# Patient Record
Sex: MALE | Race: WHITE | NOT HISPANIC OR LATINO | Employment: FULL TIME | URBAN - METROPOLITAN AREA
[De-identification: names, ages, dates, MRNs, and addresses within clinical notes are randomized per-mention and may not be internally consistent; named-entity substitution may affect disease eponyms.]

---

## 2019-08-29 ENCOUNTER — OFFICE VISIT (OUTPATIENT)
Dept: URGENT CARE | Facility: CLINIC | Age: 24
End: 2019-08-29

## 2019-08-29 VITALS
WEIGHT: 213 LBS | RESPIRATION RATE: 16 BRPM | OXYGEN SATURATION: 98 % | SYSTOLIC BLOOD PRESSURE: 118 MMHG | TEMPERATURE: 97.9 F | DIASTOLIC BLOOD PRESSURE: 70 MMHG | HEART RATE: 96 BPM

## 2019-08-29 DIAGNOSIS — K52.9 AGE (ACUTE GASTROENTERITIS): ICD-10-CM

## 2019-08-29 PROCEDURE — 99202 OFFICE O/P NEW SF 15 MIN: CPT | Performed by: NURSE PRACTITIONER

## 2019-08-29 SDOH — HEALTH STABILITY: MENTAL HEALTH: HOW OFTEN DO YOU HAVE A DRINK CONTAINING ALCOHOL?: NEVER

## 2019-08-29 ASSESSMENT — ENCOUNTER SYMPTOMS
HEADACHES: 0
NAUSEA: 1
FEVER: 0
VOMITING: 1
CHILLS: 0
DIZZINESS: 0
DIARRHEA: 1
MYALGIAS: 1
ABDOMINAL PAIN: 1

## 2019-08-29 NOTE — LETTER
August 29, 2019        Evangelista Martini  [address]  [address]  [facility] [address]        Evangelista was seen in our clinic today and he is excused from work for Tuesday, Wednesday and Thursday.  If you have any questions or concerns, please don't hesitate to call.        Sincerely,        STEVE Bennett.P.N.    Electronically Signed

## 2019-08-30 NOTE — PROGRESS NOTES
Subjective:      Evangelista Martini is a 24 y.o. male who presents with Diarrhea (pt states he has been having stomach pain x 2 days and having diarrhea)            HPI New. 24 year old male with nausea, stomach cramps and diarrhea since Tuesday. Denies fever, chills, headache. He has had one episode of vomiting. Stools are runny but no blood or pus. No known sick contacts or consumption of bad food. He has taken otc anti diarrheal for this with some improvement.  Codeine; Morphine; and Penicillins  Current Outpatient Medications on File Prior to Visit   Medication Sig Dispense Refill   • NS SOLN 60 mL with albuterol 2.5 mg/0.5 mL NEBU 5 mL 5 mg/hr by Nebulization route.       No current facility-administered medications on file prior to visit.      Social History     Socioeconomic History   • Marital status: Single     Spouse name: Not on file   • Number of children: Not on file   • Years of education: Not on file   • Highest education level: Not on file   Occupational History   • Not on file   Social Needs   • Financial resource strain: Not on file   • Food insecurity:     Worry: Not on file     Inability: Not on file   • Transportation needs:     Medical: Not on file     Non-medical: Not on file   Tobacco Use   • Smoking status: Never Smoker   • Smokeless tobacco: Never Used   Substance and Sexual Activity   • Alcohol use: Never     Frequency: Never   • Drug use: Never   • Sexual activity: Not on file   Lifestyle   • Physical activity:     Days per week: Not on file     Minutes per session: Not on file   • Stress: Not on file   Relationships   • Social connections:     Talks on phone: Not on file     Gets together: Not on file     Attends Jainism service: Not on file     Active member of club or organization: Not on file     Attends meetings of clubs or organizations: Not on file     Relationship status: Not on file   • Intimate partner violence:     Fear of current or ex partner: Not on file     Emotionally  abused: Not on file     Physically abused: Not on file     Forced sexual activity: Not on file   Other Topics Concern   • Not on file   Social History Narrative   • Not on file     Breast Cancer-related family history is not on file.      Review of Systems   Constitutional: Positive for malaise/fatigue. Negative for chills and fever.   Gastrointestinal: Positive for abdominal pain, diarrhea, nausea and vomiting.   Genitourinary: Negative.    Musculoskeletal: Positive for myalgias.   Neurological: Negative for dizziness and headaches.          Objective:     /70 (BP Location: Left arm, Patient Position: Sitting, BP Cuff Size: Adult)   Pulse 96   Temp 36.6 °C (97.9 °F) (Temporal)   Resp 16   Wt 96.6 kg (213 lb)   SpO2 98%      Physical Exam   Constitutional: He is oriented to person, place, and time. He appears well-developed and well-nourished.   Cardiovascular: Normal rate, regular rhythm and normal heart sounds.   No murmur heard.  Pulmonary/Chest: Effort normal and breath sounds normal.   Abdominal: Soft. Bowel sounds are increased. There is no tenderness. There is no CVA tenderness.   Musculoskeletal: Normal range of motion.   Neurological: He is alert and oriented to person, place, and time.   Skin: Skin is warm and dry.   Psychiatric: He has a normal mood and affect.   Nursing note and vitals reviewed.              Assessment/Plan:     1. AGE (acute gastroenteritis)       Discussed diet: start clear fluids and increase slowly as tolerated.   immodium for diarrhea as directed  Work note.  Differential diagnosis, natural history, supportive care, and indications for immediate follow-up discussed at length.

## 2021-09-04 ENCOUNTER — OFFICE VISIT (OUTPATIENT)
Dept: URGENT CARE | Facility: CLINIC | Age: 26
End: 2021-09-04
Payer: MEDICAID

## 2021-09-04 ENCOUNTER — HOSPITAL ENCOUNTER (OUTPATIENT)
Facility: MEDICAL CENTER | Age: 26
End: 2021-09-04
Attending: PHYSICIAN ASSISTANT
Payer: MEDICAID

## 2021-09-04 VITALS
HEIGHT: 74 IN | BODY MASS INDEX: 32.73 KG/M2 | WEIGHT: 255 LBS | DIASTOLIC BLOOD PRESSURE: 80 MMHG | HEART RATE: 87 BPM | TEMPERATURE: 98.8 F | OXYGEN SATURATION: 99 % | SYSTOLIC BLOOD PRESSURE: 122 MMHG | RESPIRATION RATE: 16 BRPM

## 2021-09-04 DIAGNOSIS — K52.9 GASTROENTERITIS: ICD-10-CM

## 2021-09-04 PROCEDURE — 99213 OFFICE O/P EST LOW 20 MIN: CPT | Performed by: PHYSICIAN ASSISTANT

## 2021-09-04 PROCEDURE — U0003 INFECTIOUS AGENT DETECTION BY NUCLEIC ACID (DNA OR RNA); SEVERE ACUTE RESPIRATORY SYNDROME CORONAVIRUS 2 (SARS-COV-2) (CORONAVIRUS DISEASE [COVID-19]), AMPLIFIED PROBE TECHNIQUE, MAKING USE OF HIGH THROUGHPUT TECHNOLOGIES AS DESCRIBED BY CMS-2020-01-R: HCPCS

## 2021-09-04 PROCEDURE — U0005 INFEC AGEN DETEC AMPLI PROBE: HCPCS

## 2021-09-04 ASSESSMENT — ENCOUNTER SYMPTOMS
CHILLS: 1
ABDOMINAL PAIN: 1
NAUSEA: 1
SINUS PAIN: 0
FEVER: 1
WHEEZING: 0
BLOOD IN STOOL: 0
COUGH: 1
ARTHRALGIAS: 0
MYALGIAS: 0
BLOATING: 0
SORE THROAT: 0
PALPITATIONS: 0
DIARRHEA: 1
SHORTNESS OF BREATH: 0
SWEATS: 1
HEADACHES: 0
FLATUS: 0
VOMITING: 0

## 2021-09-04 NOTE — PROGRESS NOTES
"Subjective     Evangelista Martini is a 26 y.o. male who presents with GI Problem (stomach discomfort, nausea, dry heiving, diarrhea, mild body aches, headaches, how when sleeping waking up sweaty)            Diarrhea   This is a new problem. Episode onset: 4 days ago. The problem has been unchanged. Associated symptoms include abdominal pain (generalized), chills, coughing, a fever (subjective ) and sweats. Pertinent negatives include no arthralgias, bloating, headaches, increased  flatus, myalgias or vomiting. Associated symptoms comments: Dry heaving . Nothing aggravates the symptoms. There are no known risk factors. He has tried nothing for the symptoms.       History reviewed. No pertinent past medical history.    History reviewed. No pertinent surgical history.    History reviewed. No pertinent family history.    Allergies   Allergen Reactions   • Codeine    • Morphine    • Penicillins        Medications, Allergies, and current problem list reviewed today in Epic    Review of Systems   Constitutional: Positive for chills, fever (subjective ) and malaise/fatigue.   HENT: Negative for congestion, sinus pain and sore throat.    Respiratory: Positive for cough. Negative for shortness of breath and wheezing.    Cardiovascular: Negative for chest pain, palpitations and leg swelling.   Gastrointestinal: Positive for abdominal pain (generalized), diarrhea and nausea. Negative for bloating, blood in stool, flatus and vomiting.   Musculoskeletal: Negative for arthralgias and myalgias.   Skin: Negative for rash.   Neurological: Negative for headaches.         All other systems reviewed and are negative.         Objective     /80   Pulse 87   Temp 37.1 °C (98.8 °F)   Resp 16   Ht 1.88 m (6' 2\")   Wt 116 kg (255 lb)   SpO2 99%   BMI 32.74 kg/m²      Physical Exam  Constitutional:       General: He is not in acute distress.     Appearance: He is not ill-appearing.   HENT:      Head: Normocephalic and " atraumatic.      Nose: Nose normal.      Mouth/Throat:      Mouth: Mucous membranes are moist.      Pharynx: No posterior oropharyngeal erythema.   Cardiovascular:      Rate and Rhythm: Normal rate and regular rhythm.      Heart sounds: Normal heart sounds. No murmur heard.     Pulmonary:      Effort: Pulmonary effort is normal. No respiratory distress.      Breath sounds: Normal breath sounds. No wheezing, rhonchi or rales.   Abdominal:      General: There is no distension.      Palpations: Abdomen is soft. There is no mass.      Tenderness: There is no abdominal tenderness. There is no guarding or rebound.   Skin:     General: Skin is warm and dry.   Neurological:      General: No focal deficit present.      Mental Status: He is alert and oriented to person, place, and time.   Psychiatric:         Mood and Affect: Mood normal.         Behavior: Behavior normal.         Thought Content: Thought content normal.         Judgment: Judgment normal.                             Assessment & Plan        1. Gastroenteritis    Encouraged fluids, rest, BRAT diet  Patient refuses anti-nausea meds  - SARS-CoV-2 PCR (24 hour In-House): Collect NP swab in [facility]; Future  COVID test pending.  Isolation guidelines, conservative measures and ER precautions discussed.   COVID handout give.    Differential diagnoses, Supportive care, and indications for immediate follow-up discussed with patient.   Pathogenesis of diagnosis discussed including typical length and natural progression.   Instructed to return to clinic or nearest emergency department for any change in condition, further concerns, or worsening of symptoms.    The patient demonstrated a good understanding and agreed with the treatment plan.    Rosalie Stallworth P.A.-C.

## 2021-09-05 DIAGNOSIS — K52.9 GASTROENTERITIS: ICD-10-CM

## 2021-09-06 LAB
COVID ORDER STATUS COVID19: NORMAL
SARS-COV-2 RNA RESP QL NAA+PROBE: NOTDETECTED
SPECIMEN SOURCE: NORMAL

## 2021-10-06 ENCOUNTER — APPOINTMENT (OUTPATIENT)
Dept: RADIOLOGY | Facility: IMAGING CENTER | Age: 26
End: 2021-10-06
Attending: PHYSICIAN ASSISTANT
Payer: MEDICAID

## 2021-10-06 ENCOUNTER — HOSPITAL ENCOUNTER (OUTPATIENT)
Facility: MEDICAL CENTER | Age: 26
End: 2021-10-06
Attending: PHYSICIAN ASSISTANT
Payer: MEDICAID

## 2021-10-06 ENCOUNTER — OFFICE VISIT (OUTPATIENT)
Dept: URGENT CARE | Facility: CLINIC | Age: 26
End: 2021-10-06
Payer: MEDICAID

## 2021-10-06 VITALS
HEART RATE: 114 BPM | SYSTOLIC BLOOD PRESSURE: 122 MMHG | BODY MASS INDEX: 30.8 KG/M2 | RESPIRATION RATE: 20 BRPM | OXYGEN SATURATION: 94 % | HEIGHT: 74 IN | WEIGHT: 240 LBS | TEMPERATURE: 97.6 F | DIASTOLIC BLOOD PRESSURE: 84 MMHG

## 2021-10-06 DIAGNOSIS — Z20.822 SUSPECTED COVID-19 VIRUS INFECTION: ICD-10-CM

## 2021-10-06 DIAGNOSIS — J18.9 PNEUMONIA OF BOTH LUNGS DUE TO INFECTIOUS ORGANISM, UNSPECIFIED PART OF LUNG: ICD-10-CM

## 2021-10-06 DIAGNOSIS — J98.8 RTI (RESPIRATORY TRACT INFECTION): ICD-10-CM

## 2021-10-06 DIAGNOSIS — J45.901 MILD ASTHMA WITH ACUTE EXACERBATION, UNSPECIFIED WHETHER PERSISTENT: ICD-10-CM

## 2021-10-06 LAB
EXTERNAL QUALITY CONTROL: NORMAL
SARS-COV+SARS-COV-2 AG RESP QL IA.RAPID: NEGATIVE

## 2021-10-06 PROCEDURE — 71046 X-RAY EXAM CHEST 2 VIEWS: CPT | Mod: TC | Performed by: PHYSICIAN ASSISTANT

## 2021-10-06 PROCEDURE — U0003 INFECTIOUS AGENT DETECTION BY NUCLEIC ACID (DNA OR RNA); SEVERE ACUTE RESPIRATORY SYNDROME CORONAVIRUS 2 (SARS-COV-2) (CORONAVIRUS DISEASE [COVID-19]), AMPLIFIED PROBE TECHNIQUE, MAKING USE OF HIGH THROUGHPUT TECHNOLOGIES AS DESCRIBED BY CMS-2020-01-R: HCPCS

## 2021-10-06 PROCEDURE — 87426 SARSCOV CORONAVIRUS AG IA: CPT | Performed by: PHYSICIAN ASSISTANT

## 2021-10-06 PROCEDURE — U0005 INFEC AGEN DETEC AMPLI PROBE: HCPCS

## 2021-10-06 PROCEDURE — 99214 OFFICE O/P EST MOD 30 MIN: CPT | Mod: CS | Performed by: PHYSICIAN ASSISTANT

## 2021-10-06 RX ORDER — DEXAMETHASONE 6 MG/1
6 TABLET ORAL DAILY
Qty: 5 TABLET | Refills: 0 | Status: SHIPPED | OUTPATIENT
Start: 2021-10-06 | End: 2021-10-11

## 2021-10-06 RX ORDER — ALBUTEROL SULFATE 90 UG/1
2 AEROSOL, METERED RESPIRATORY (INHALATION) EVERY 6 HOURS PRN
Qty: 8.5 G | Refills: 0 | Status: SHIPPED | OUTPATIENT
Start: 2021-10-06

## 2021-10-06 RX ORDER — DOXYCYCLINE HYCLATE 100 MG
100 TABLET ORAL 2 TIMES DAILY
Qty: 14 TABLET | Refills: 0 | Status: SHIPPED | OUTPATIENT
Start: 2021-10-06 | End: 2021-10-13

## 2021-10-06 ASSESSMENT — ENCOUNTER SYMPTOMS
WHEEZING: 1
COUGH: 1
HEMOPTYSIS: 0
SORE THROAT: 0
ABDOMINAL PAIN: 0
CHILLS: 0
MYALGIAS: 0
HEADACHES: 0
RHINORRHEA: 0
DIARRHEA: 1
NAUSEA: 0
SINUS PAIN: 0
FEVER: 0
VOMITING: 0
SPUTUM PRODUCTION: 0
SHORTNESS OF BREATH: 1

## 2021-10-06 NOTE — PROGRESS NOTES
"Subjective     Evangelista Taran Martini is a 26 y.o. male who presents with Shortness of Breath (fatigue, diarrhea, x1week, has hx of asthma)            Cough  This is a new problem. Episode onset: 4 days. The problem has been gradually worsening. The cough is non-productive. Associated symptoms include shortness of breath and wheezing. Pertinent negatives include no chest pain, chills, ear pain, fever, headaches, hemoptysis, myalgias, nasal congestion, rhinorrhea or sore throat. He has tried a beta-agonist inhaler and OTC cough suppressant for the symptoms. The treatment provided no relief. His past medical history is significant for asthma.     The patient denies known COVID exposure.  He is not vaccinated against COVID    No past medical history on file.    No past surgical history on file.    No family history on file.    Allergies   Allergen Reactions   • Codeine    • Morphine    • Penicillins        Medications, Allergies, and current problem list reviewed today in Epic      Review of Systems   Constitutional: Positive for malaise/fatigue. Negative for chills and fever.   HENT: Negative for congestion, ear pain, rhinorrhea, sinus pain and sore throat.    Respiratory: Positive for cough, shortness of breath and wheezing. Negative for hemoptysis and sputum production.    Cardiovascular: Negative for chest pain.   Gastrointestinal: Positive for diarrhea. Negative for abdominal pain, nausea and vomiting.   Musculoskeletal: Negative for myalgias.   Neurological: Negative for headaches.     All other systems reviewed and are negative.            Objective     /84   Pulse (!) 114   Temp 36.4 °C (97.6 °F) (Temporal)   Resp 20   Ht 1.88 m (6' 2\")   Wt 109 kg (240 lb)   SpO2 94%   BMI 30.81 kg/m²      Physical Exam  Constitutional:       General: He is not in acute distress.     Appearance: He is ill-appearing (mild).   HENT:      Head: Normocephalic and atraumatic.      Mouth/Throat:      Mouth: Mucous " membranes are moist.      Pharynx: No posterior oropharyngeal erythema.   Eyes:      Conjunctiva/sclera: Conjunctivae normal.   Cardiovascular:      Rate and Rhythm: Regular rhythm. Tachycardia present.      Heart sounds: Normal heart sounds.   Pulmonary:      Effort: Pulmonary effort is normal. No respiratory distress.      Breath sounds: Wheezing (diffuse) present. No rhonchi or rales.   Skin:     General: Skin is warm and dry.      Findings: No rash.   Neurological:      General: No focal deficit present.      Mental Status: He is alert and oriented to person, place, and time.   Psychiatric:         Mood and Affect: Mood normal.         Behavior: Behavior normal.         Thought Content: Thought content normal.         Judgment: Judgment normal.       10/6/2021 4:59 PM     HISTORY/REASON FOR EXAM:  Shortness of Breath; cough and shortness of breath.        TECHNIQUE/EXAM DESCRIPTION AND NUMBER OF VIEWS:  Two views of the chest.     COMPARISON:  None.     FINDINGS:  The heart is normal in size.  There are bilateral peripheral interstitial opacities. There appears be more focal consolidation right middle lobe.  No pleural effusions are appreciated.  No pneumothorax.     IMPRESSION:     Bilateral pulmonary consolidation with a peripheral groundglass pattern suggestive of Covid 19 pneumonia.     More focal consolidation in the right middle lobe could also be due to Covid 19 pneumonia, atelectasis or bacterial pneumonia.        Exam Ended: 10/06/21  5:06 PM                              Assessment & Plan        1. Suspected COVID-19 virus infection  POCT SARS-COV Antigen TARA (Symptomatic Only)    SARS-CoV-2 PCR (24 hour In-House): Collect NP swab in [facility]    DX-CHEST-2 VIEWS    doxycycline (VIBRAMYCIN) 100 MG Tab    dexamethasone (DECADRON) 6 MG Tab    albuterol 108 (90 Base) MCG/ACT Aero Soln inhalation aerosol   2. Pneumonia of both lungs due to infectious organism, unspecified part of lung  doxycycline (VIBRAMYCIN)  100 MG Tab    dexamethasone (DECADRON) 6 MG Tab    albuterol 108 (90 Base) MCG/ACT Aero Soln inhalation aerosol   3. Mild asthma with acute exacerbation, unspecified whether persistent       poct COVID- negative    X-ray reviewed by me and consistent with COVID pneumonia and possibly bacterial pneumonia.  COVID PCR test pending.  Isolation guidelines, conservative measures and ER precautions discussed.   COVID handout given.  COVER with DOXY for possible bacterial component.   RX Decadron 6mg daily x 5 days.   Albuterol     Continue to monitor pulse ox. ED if unable to maintain above 90% or if any drastic worsening in symptoms.    Differential diagnoses, Supportive care, and indications for immediate follow-up discussed with patient.   Pathogenesis of diagnosis discussed including typical length and natural progression.   Instructed to return to clinic or nearest emergency department for any change in condition, further concerns, or worsening of symptoms.    The patient demonstrated a good understanding and agreed with the treatment plan.    Rosalie Stallworth P.A.-C.

## 2021-10-07 DIAGNOSIS — Z20.822 SUSPECTED COVID-19 VIRUS INFECTION: ICD-10-CM

## 2021-10-07 LAB — COVID ORDER STATUS COVID19: NORMAL

## 2021-10-08 LAB
SARS-COV-2 RNA RESP QL NAA+PROBE: DETECTED
SPECIMEN SOURCE: ABNORMAL

## 2021-10-11 ENCOUNTER — OFFICE VISIT (OUTPATIENT)
Dept: URGENT CARE | Facility: CLINIC | Age: 26
End: 2021-10-11
Payer: MEDICAID

## 2021-10-11 VITALS
BODY MASS INDEX: 30.67 KG/M2 | DIASTOLIC BLOOD PRESSURE: 70 MMHG | SYSTOLIC BLOOD PRESSURE: 102 MMHG | HEART RATE: 104 BPM | TEMPERATURE: 97.1 F | WEIGHT: 239 LBS | OXYGEN SATURATION: 98 % | RESPIRATION RATE: 18 BRPM | HEIGHT: 74 IN

## 2021-10-11 DIAGNOSIS — J12.82 PNEUMONIA DUE TO COVID-19 VIRUS: ICD-10-CM

## 2021-10-11 DIAGNOSIS — R05.9 COUGH: ICD-10-CM

## 2021-10-11 DIAGNOSIS — U07.1 PNEUMONIA DUE TO COVID-19 VIRUS: ICD-10-CM

## 2021-10-11 PROCEDURE — 99214 OFFICE O/P EST MOD 30 MIN: CPT | Mod: CS | Performed by: PHYSICIAN ASSISTANT

## 2021-10-11 RX ORDER — BENZONATATE 200 MG/1
200 CAPSULE ORAL 3 TIMES DAILY PRN
Qty: 60 CAPSULE | Refills: 0 | Status: SHIPPED | OUTPATIENT
Start: 2021-10-11

## 2021-10-11 ASSESSMENT — ENCOUNTER SYMPTOMS
FEVER: 0
SPUTUM PRODUCTION: 0
CHILLS: 0
COUGH: 1

## 2021-10-11 NOTE — PROGRESS NOTES
Subjective:   Evangelista Martini is a 26 y.o. male who presents for Coronavirus Screening (needs a second test because he had one done and it was negative and the other was positive)        Patient with recent history of COVID-19 presents for recheck and with some concerns over the accuracy of his testing.  He states that he had 2 tests done at his last visit and one was positive and 1 was negative-he is not sure which to believe.  Overall patient is feeling much improved.  He finished the doxycycline that was prescribed and continues to take the prednisone.  He is not requiring use of the inhaler.  He is monitoring his oxygen saturation at home and this has been within normal limits.  Cough is frequent and his biggest concern currently, as his quarantine is almost over.  He is concerned about returning to work with a persistent cough.  He denies chest pain, shortness of breath, difficulty breathing, fevers.  He tried taking OTC antitussive, but states that he did not tolerate this well.    Review of Systems   Constitutional: Positive for malaise/fatigue. Negative for chills and fever.   HENT: Positive for congestion.    Respiratory: Positive for cough. Negative for sputum production.    Cardiovascular: Negative for chest pain.       PMH:  has no past medical history on file.  MEDS:   Current Outpatient Medications:   •  benzonatate (TESSALON) 200 MG capsule, Take 1 Capsule by mouth 3 times a day as needed., Disp: 60 Capsule, Rfl: 0  •  doxycycline (VIBRAMYCIN) 100 MG Tab, Take 1 Tablet by mouth 2 times a day for 7 days., Disp: 14 Tablet, Rfl: 0  •  dexamethasone (DECADRON) 6 MG Tab, Take 1 Tablet by mouth every day for 5 days., Disp: 5 Tablet, Rfl: 0  •  albuterol 108 (90 Base) MCG/ACT Aero Soln inhalation aerosol, Inhale 2 Puffs every 6 hours as needed for Shortness of Breath., Disp: 8.5 g, Rfl: 0  •  NS SOLN 60 mL with albuterol 2.5 mg/0.5 mL NEBU 5 mL, 5 mg/hr by Nebulization route., Disp: , Rfl:   ALLERGIES:  "  Allergies   Allergen Reactions   • Codeine    • Morphine    • Penicillins      SURGHX: History reviewed. No pertinent surgical history.  SOCHX:  reports that he has never smoked. He has never used smokeless tobacco. He reports that he does not drink alcohol and does not use drugs.  FH: Family history was reviewed, no pertinent findings to report   Objective:   /70 (BP Location: Left arm, Patient Position: Sitting, BP Cuff Size: Adult long)   Pulse (!) 104   Temp 36.2 °C (97.1 °F) (Temporal)   Resp 18   Ht 1.88 m (6' 2\")   Wt 108 kg (239 lb)   SpO2 98%   BMI 30.69 kg/m²   Physical Exam  Vitals reviewed.   Constitutional:       General: He is not in acute distress.     Appearance: Normal appearance. He is well-developed. He is not toxic-appearing.   HENT:      Head: Normocephalic and atraumatic.      Right Ear: External ear normal.      Left Ear: External ear normal.      Nose: Congestion present.   Eyes:      General: Gaze aligned appropriately.   Cardiovascular:      Rate and Rhythm: Regular rhythm. Tachycardia present.      Heart sounds: Normal heart sounds, S1 normal and S2 normal.   Pulmonary:      Effort: Pulmonary effort is normal. No respiratory distress.      Breath sounds: Normal breath sounds. No stridor. No decreased breath sounds, wheezing, rhonchi or rales.      Comments: Occasional dry cough  Musculoskeletal:      Cervical back: Neck supple.   Skin:     General: Skin is warm and dry.      Capillary Refill: Capillary refill takes less than 2 seconds.   Neurological:      Mental Status: He is alert and oriented to person, place, and time.      Comments: CN2-12 grossly intact   Psychiatric:         Speech: Speech normal.         Behavior: Behavior normal.           Assessment/Plan:   1. Pneumonia due to COVID-19 virus    2. Cough  - benzonatate (TESSALON) 200 MG capsule; Take 1 Capsule by mouth 3 times a day as needed.  Dispense: 60 Capsule; Refill: 0    Records from 10/6/2021 reviewed.  " Chest x-ray suggestive Covid 19 pneumonia with possible concomitant bacterial pneumonia.  Patient was treated with antibiotics with Decadron.  His symptoms have significantly improved and his lungs are clear to auscultation bilaterally.  He is mildly tachycardic with oxygen saturations at 98%.    Sensitivities and specificities of both the rapid COVID-19 testing and PCR testing discussed.  He was advised that the rapid testing has a high false-negative rate and is often times an accurate.  PCR testing is typically much more accurate and he did test positive by PCR.  There is no need to repeat his testing today.    Patient instructed to complete full quarantine in accordance with CDC guidelines.  He was prescribed Tessalon Perles to use as needed to help control cough when he returns to work.  Strict return and ED precautions.    Differential diagnosis, natural history, supportive care, and indications for immediate follow-up discussed.